# Patient Record
Sex: MALE
[De-identification: names, ages, dates, MRNs, and addresses within clinical notes are randomized per-mention and may not be internally consistent; named-entity substitution may affect disease eponyms.]

---

## 2018-03-05 ENCOUNTER — HOSPITAL ENCOUNTER (EMERGENCY)
Dept: HOSPITAL 25 - UCEAST | Age: 33
Discharge: HOME | End: 2018-03-05
Payer: COMMERCIAL

## 2018-03-05 VITALS — DIASTOLIC BLOOD PRESSURE: 89 MMHG | SYSTOLIC BLOOD PRESSURE: 144 MMHG
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
## 2018-03-05 DIAGNOSIS — I89.1: ICD-10-CM

## 2018-03-05 DIAGNOSIS — S61.402A: Primary | ICD-10-CM
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
## 2018-03-05 DIAGNOSIS — L08.9: ICD-10-CM
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
## 2018-03-05 PROCEDURE — 99202 OFFICE O/P NEW SF 15 MIN: CPT

## 2018-03-05 PROCEDURE — G0463 HOSPITAL OUTPT CLINIC VISIT: HCPCS

## 2018-03-05 NOTE — UC
HPI Wound/Suture Re-check





- HPI Summary


HPI Summary: 





While using belt-crissy 2 days ago pt accidentally injured skin on L palm at 

base of thumb. He washed the area with soapy water and dressed it with abx 

ointment. Yesterday had lots of pain, then this morning noticed a lot of 

drainage and a small red streak up 





- History Of Current Complaint


Chief Complaint: UCLaceration


Stated Complaint: HAND INJURY


Time Seen by Provider: 03/05/18 13:38


Hx Obtained From: Patient


Onset/Duration: Gradual Onset, Lasting Days


Severity: Mild


Pain Intensity: 3


Hands: 


  __________________________














  __________________________





 1 - skin avulsion





 2 - lymphangitic streak








- Allergies/Home Medications


Allergies/Adverse Reactions: 


 Allergies











Allergy/AdvReac Type Severity Reaction Status Date / Time


 


No Known Allergies Allergy   Verified 03/05/18 13:18














PMH/Surg Hx/FS Hx/Imm Hx


Previously Healthy: Yes





- Surgical History


Surgical History: None





- Family History


Known Family History: Positive: Hypertension





- Social History


Lives: With Family


Alcohol Use: None


Substance Use Type: None


Smoking Status (MU): Never Smoked Tobacco





- Immunization History


Most Recent Tetanus Shot: unsure





Review of Systems


Constitutional: Negative


Skin: Other - abrasion/avulsion L hand


Eyes: Negative


ENT: Negative


Respiratory: Negative


Cardiovascular: Negative


Gastrointestinal: Negative


Genitourinary: Negative


Motor: Negative


Neurovascular: Negative


Musculoskeletal: Negative


Neurological: Negative


Psychological: Negative


Is Patient Immunocompromised?: No


All Other Systems Reviewed And Are Negative: Yes





Physical Exam


Triage Information Reviewed: Yes


Appearance: Well-Appearing, No Pain Distress, Well-Nourished


Vital Signs: 


 Initial Vital Signs











Temp  97.2 F   03/05/18 13:15


 


Pulse  63   03/05/18 13:15


 


Resp  18   03/05/18 13:15


 


BP  144/89   03/05/18 13:15


 


Pulse Ox  97   03/05/18 13:15











Vital Signs Reviewed: Yes


Eye Exam: Normal


Eyes: Positive: Conjunctiva Clear


ENT Exam: Normal


ENT: Positive: Normal ENT inspection, Hearing grossly normal, Pharynx normal, 

TMs normal


Dental Exam: Normal


Neck exam: Normal


Neck: Positive: Supple, Nontender, No Lymphadenopathy


Respiratory Exam: Normal


Respiratory: Positive: Chest non-tender, Lungs clear, Normal breath sounds, No 

respiratory distress, No accessory muscle use


Cardiovascular Exam: Normal


Cardiovascular: Positive: RRR, No Murmur


Musculoskeletal Exam: Normal


Neurological Exam: Normal


Neurological: Positive: Alert


Psychological Exam: Normal


Skin Exam: Other - Irreg open area L hand approx 3cm x 4cm; faint 10-cm 

lymphangitic streaking from L wrist.





Course/Dx





- Differential Dx - Laceration/Wound


Provider Diagnoses: Skin avulsion L hand with secondary infection.  L forearm 

lymphangitis





Discharge





- Discharge Plan


Condition: Stable


Disposition: HOME


Prescriptions: 


Cephalexin CAP* [Keflex 500 CAP*] 500 mg PO QID #20 cap


Patient Education Materials:  Lymphangitis (ED), Skin Avulsion (ED)


Referrals: 


No Primary Care Phys,NOPCP [Primary Care Provider] - 


Additional Instructions: 


Continue to wash the area gently in warm, soapy water twice daily followed by a 

bandage change. You can use either antibiotic ointment or vaseline to lubricate 

the wound and prevent the bandage from sticking. The red streak on your arm 

should gradually fade in the next 48 hours.





Come back right away if you have worsening redness, streaking, swelling, 

drainage, or new fevers.